# Patient Record
(demographics unavailable — no encounter records)

---

## 2024-12-17 NOTE — CARDIOLOGY SUMMARY
[de-identified] : 6/26/23- Sinus 88, normal axis, no ST changes, QTc 425 10/10/23- Sinus 85, normal axis, no ST abnormality, QTc 438 10/3/24- Sinus 72 1st degree, normal axis, no ST abnormality, QTc 417 [de-identified] : 9/12/23- LV EF 61%, PASP 18, mild MR/UT

## 2024-12-17 NOTE — HISTORY OF PRESENT ILLNESS
[FreeTextEntry1] : 40F prior pregnancy with elevated BP in the 1st trimester (150/100s) then with persistent elevated BP, presents for cardiology evaluation seen on 2023 elevated /90s started on nifedipine but did not tolerate, switched to labetalol 400mg BID, last seen 2023, Echo with LV EF 61% with mild MR/CO, recent still uncontrolled BP up to 150/100s increased labetalol 400mg TID,  2 weeks prior to delivery was instructed to take 600 mg TID, induced and delivered 10/24/2023, last seen 2023 remains on labetalol 600mg BID did not return for 6 weeks visit, self discontinued labetalol use in 2023 but then recently noted early onset HTN, last visit 10/2024 started on Losartan/ HCTZ, now presents for follow-up.  Reports feeling well. States BP has been well controlled since starting Losartan HCTZ 50-12.5mg daily. She did not need to increase the dose. BP has been 120s/ <80. Denies chest pain, SOB at rest, MEJIA, palpitations, lightheadedness, dizziness, fatigue, syncope, near syncope and LE edema. Denies smoking, excessive alcohol and illicit drug use. She does not have much time to exercise due to working and caring for 3 kids.  Prior visit 10/3/2024: Reports her father recent  2 months ago with CAD/acute MI, has no known cardiac risk factors except early onset HTN, since she has been more aware of her health recently noted some headaches then rechecked home BP with persistent readings of 150/110s, denies chest pain or shortness of breath. Returned back to work last week, denies stressors, social alcohol. Has not had blood work since pregnancy and does not have a PCP currently.    Prior visit 2023:  She gave birth to a heathy baby boy 10/24/2023 Discharged on labetalol 600mg TID, with instructions to hold if BP <120/80 Taking 600 qam and 600 qpm she has been holding the afternoon dose for the past 1-2 weeks due to SBP <120, not documented on her BP log. she is not monitoring her BP at night BP continues to run high in the mornings - last high am readings 23 am 153/98   She has had a history of chronic intermittent headaches, not correlating to high BP feels a little winded after carrying objects and running up and down stairs  She denies chest pain, SOB/MEJIA, PND, orthopnea, LE edema, palpitations, lightheadedness, syncope   Prior visit  10/10/2023 Reports feeling well since on TID labetalol with home BP average about 130/80s, denies chest discomfort or LE swelling. Pending  in 2 weeks due to breach position.   Still working as CPA.    Prior visit 2023:  Reports feeling well no cardiac complains, couldn't tolerate nifedipine due to headache, since on labetalol home /90s, has some tingling sensation over her head, no dizziness or palpitations. Denies any legs swelling, pending follow up with OB later this month.    Prior visit 2023:  NASIM in 2023 to be delivered at [facility] Dr. Elva Singh [phone]  1st pregnancy 8yrs with gestational HTN at the last trimester with 2 weeks early, delivered vaginally.  2nd pregnancy 6yrs ago no issue.  Reports feeling well except intermittent headache, home BP logs average 160-190s/90-110s using wrist cuff. She does not have a PCP and no routine primary care previously. Denies prior syncope or dizziness, no chest pain, denies legs edema, reports no prior preeclampsia history. She has started on ASA 81mg for prevention and pending to see MFM.    Nonsmoker, no alcohol Father with HTN age 40s Work as CPA

## 2024-12-17 NOTE — DISCUSSION/SUMMARY
[FreeTextEntry1] : 40F prior pregnancy with elevated BP in the 1st trimester (150/100s) then with persistent elevated BP, presents for cardiology evaluation seen on 6/2023 elevated /90s started on nifedipine but did not tolerate, switched to labetalol 400mg BID, last seen 8/2023, Echo with LV EF 61% with mild MR/ME, recent still uncontrolled BP up to 150/100s increased labetalol 400mg TID,  2 weeks prior to delivery was instructed to take 600 mg TID, induced and delivered 10/24/2023, last seen 11/2023 remains on labetalol 600mg BID did not return for 6 weeks visit, self discontinued labetalol use in 12/2023 but then recently noted early onset HTN, last visit 10/2024 started on Losartan/ HCTZ, now presents for follow-up.  Interval recurrent of elevated BP readings now consistent with early onset HTN since previously with gestational HTN, will need long term antihypertensive use as no other risk factors, no intention for repeat pregnancy. BP controlled on losartan-HCTZ 50-12.5mg. Advised to avoid sodium rich foods/ drinks. Advised to continue home BP monitoring.   Recent paternal history of cardiac death from acute MI, EKG within normal and no cardiac complains, defer stress testing for now. Did not go for labs ordered last visit. Reordered routine labs, lipid, Lp(a), and A1c level. Consider CT-coronary artery calcium score if elevated lipid panel.   Follow up in 6 months, sooner if needed.  Patient verbalized understanding and is in agreement with the above plan.  Candis Villeda was present in office at the time of visit.

## 2025-06-15 NOTE — DISCUSSION/SUMMARY
[FreeTextEntry1] : 40F prior pregnancy with elevated BP in the 1st trimester (150/100s) then with persistent elevated BP, presents for cardiology evaluation seen on 6/2023 elevated /90s started on nifedipine but did not tolerate, switched to labetalol 400mg BID, last seen 8/2023, Echo with LV EF 61% with mild MR/OR, recent still uncontrolled BP up to 150/100s increased labetalol 400mg TID,  2 weeks prior to delivery was instructed to take 600 mg TID, induced and delivered 10/24/2023, last seen 11/2023 remains on labetalol 600mg BID did not return for 6 weeks visit, self discontinued labetalol use in 12/2023 but then recently noted early onset HTN, last visit 10/2024 started on Losartan/ HCTZ, now presents for follow-up.  Interval recurrent of elevated BP readings now consistent with early onset HTN since previously with gestational HTN, will need long term antihypertensive use as no other risk factors, no intention for repeat pregnancy. BP controlled on losartan-HCTZ 50-12.5mg. Advised to avoid sodium rich foods/ drinks. Advised to continue home BP monitoring.   Recent paternal history of cardiac death from acute MI, EKG within normal and no cardiac complains, defer stress testing for now. Did not go for labs ordered last visit. Reordered routine labs, lipid, Lp(a), and A1c level. Consider CT-coronary artery calcium score if elevated lipid panel.   Follow up in 6 months, sooner if needed.  Patient verbalized understanding and is in agreement with the above plan.  Candis Villeda was present in office at the time of visit.

## 2025-06-15 NOTE — DISCUSSION/SUMMARY
[FreeTextEntry1] : 40F prior pregnancy with elevated BP in the 1st trimester (150/100s) then with persistent elevated BP, presents for cardiology evaluation seen on 6/2023 elevated /90s started on nifedipine but did not tolerate, switched to labetalol 400mg BID, last seen 8/2023, Echo with LV EF 61% with mild MR/VT, recent still uncontrolled BP up to 150/100s increased labetalol 400mg TID,  2 weeks prior to delivery was instructed to take 600 mg TID, induced and delivered 10/24/2023, last seen 11/2023 remains on labetalol 600mg BID did not return for 6 weeks visit, self discontinued labetalol use in 12/2023 but then recently noted early onset HTN, last visit 10/2024 started on Losartan/ HCTZ, now presents for follow-up.  Interval recurrent of elevated BP readings now consistent with early onset HTN since previously with gestational HTN, will need long term antihypertensive use as no other risk factors, no intention for repeat pregnancy. BP controlled on losartan-HCTZ 50-12.5mg. Advised to avoid sodium rich foods/ drinks. Advised to continue home BP monitoring.   Recent paternal history of cardiac death from acute MI, EKG within normal and no cardiac complains, defer stress testing for now. Did not go for labs ordered last visit. Reordered routine labs, lipid, Lp(a), and A1c level. Consider CT-coronary artery calcium score if elevated lipid panel.   Follow up in 6 months, sooner if needed.  Patient verbalized understanding and is in agreement with the above plan.  Candis Villeda was present in office at the time of visit.

## 2025-06-15 NOTE — CARDIOLOGY SUMMARY
[de-identified] : 9/12/23- LV EF 61%, PASP 18, mild MR/VA [de-identified] : 6/26/23- Sinus 88, normal axis, no ST changes, QTc 425 10/10/23- Sinus 85, normal axis, no ST abnormality, QTc 438 10/3/24- Sinus 72 1st degree, normal axis, no ST abnormality, QTc 417

## 2025-06-15 NOTE — HISTORY OF PRESENT ILLNESS
[FreeTextEntry1] : 41F prior pregnancy with elevated BP in the 1st trimester (150/100s) then with persistent elevated BP, presents for cardiology evaluation seen on 2023 elevated /90s started on nifedipine but did not tolerate, switched to labetalol 400mg BID, last seen 2023, Echo with LV EF 61% with mild MR/AL, recent still uncontrolled BP up to 150/100s increased labetalol 400mg TID,  2 weeks prior to delivery was instructed to take 600 mg TID, induced and delivered 10/24/2023, last seen 2023 remains on labetalol 600mg BID did not return for 6 weeks visit, self discontinued labetalol use in 2023 but then recently noted early onset HTN, prior visit  started on Losartan/ HCTZ, last seen 2024, now presents for follow-up.   Recent lab , Lp(a) 37.7 A1c 5.1%   Prior visit 2024:  Reports feeling well. States BP has been well controlled since starting Losartan HCTZ 50-12.5mg daily. She did not need to increase the dose. BP has been 120s/ <80. Denies chest pain, SOB at rest, MEJIA, palpitations, lightheadedness, dizziness, fatigue, syncope, near syncope and LE edema. Denies smoking, excessive alcohol and illicit drug use. She does not have much time to exercise due to working and caring for 3 kids.  Prior visit 10/3/2024: Reports her father recent  2 months ago with CAD/acute MI, has no known cardiac risk factors except early onset HTN, since she has been more aware of her health recently noted some headaches then rechecked home BP with persistent readings of 150/110s, denies chest pain or shortness of breath. Returned back to work last week, denies stressors, social alcohol. Has not had blood work since pregnancy and does not have a PCP currently.    Prior visit 2023:  She gave birth to a heathy baby boy 10/24/2023 Discharged on labetalol 600mg TID, with instructions to hold if BP <120/80 Taking 600 qam and 600 qpm she has been holding the afternoon dose for the past 1-2 weeks due to SBP <120, not documented on her BP log. she is not monitoring her BP at night BP continues to run high in the mornings - last high am readings 23 am 153/98   She has had a history of chronic intermittent headaches, not correlating to high BP feels a little winded after carrying objects and running up and down stairs  She denies chest pain, SOB/MEJIA, PND, orthopnea, LE edema, palpitations, lightheadedness, syncope   Prior visit  10/10/2023 Reports feeling well since on TID labetalol with home BP average about 130/80s, denies chest discomfort or LE swelling. Pending  in 2 weeks due to breach position.   Still working as CPA.    Prior visit 2023:  Reports feeling well no cardiac complains, couldn't tolerate nifedipine due to headache, since on labetalol home /90s, has some tingling sensation over her head, no dizziness or palpitations. Denies any legs swelling, pending follow up with OB later this month.    Prior visit 2023:  NASIM in 2023 to be delivered at [facility] Dr. Elva Singh [phone]  1st pregnancy 8yrs with gestational HTN at the last trimester with 2 weeks early, delivered vaginally.  2nd pregnancy 6yrs ago no issue.  Reports feeling well except intermittent headache, home BP logs average 160-190s/90-110s using wrist cuff. She does not have a PCP and no routine primary care previously. Denies prior syncope or dizziness, no chest pain, denies legs edema, reports no prior preeclampsia history. She has started on ASA 81mg for prevention and pending to see MFM.    Nonsmoker, no alcohol Father with HTN age 40s Work as CPA

## 2025-06-15 NOTE — CARDIOLOGY SUMMARY
[de-identified] : 9/12/23- LV EF 61%, PASP 18, mild MR/AL [de-identified] : 6/26/23- Sinus 88, normal axis, no ST changes, QTc 425 10/10/23- Sinus 85, normal axis, no ST abnormality, QTc 438 10/3/24- Sinus 72 1st degree, normal axis, no ST abnormality, QTc 417

## 2025-06-15 NOTE — HISTORY OF PRESENT ILLNESS
[FreeTextEntry1] : 41F prior pregnancy with elevated BP in the 1st trimester (150/100s) then with persistent elevated BP, presents for cardiology evaluation seen on 2023 elevated /90s started on nifedipine but did not tolerate, switched to labetalol 400mg BID, last seen 2023, Echo with LV EF 61% with mild MR/OR, recent still uncontrolled BP up to 150/100s increased labetalol 400mg TID,  2 weeks prior to delivery was instructed to take 600 mg TID, induced and delivered 10/24/2023, last seen 2023 remains on labetalol 600mg BID did not return for 6 weeks visit, self discontinued labetalol use in 2023 but then recently noted early onset HTN, prior visit  started on Losartan/ HCTZ, last seen 2024, now presents for follow-up.   Recent lab , Lp(a) 37.7 A1c 5.1%   Prior visit 2024:  Reports feeling well. States BP has been well controlled since starting Losartan HCTZ 50-12.5mg daily. She did not need to increase the dose. BP has been 120s/ <80. Denies chest pain, SOB at rest, MEJIA, palpitations, lightheadedness, dizziness, fatigue, syncope, near syncope and LE edema. Denies smoking, excessive alcohol and illicit drug use. She does not have much time to exercise due to working and caring for 3 kids.  Prior visit 10/3/2024: Reports her father recent  2 months ago with CAD/acute MI, has no known cardiac risk factors except early onset HTN, since she has been more aware of her health recently noted some headaches then rechecked home BP with persistent readings of 150/110s, denies chest pain or shortness of breath. Returned back to work last week, denies stressors, social alcohol. Has not had blood work since pregnancy and does not have a PCP currently.    Prior visit 2023:  She gave birth to a heathy baby boy 10/24/2023 Discharged on labetalol 600mg TID, with instructions to hold if BP <120/80 Taking 600 qam and 600 qpm she has been holding the afternoon dose for the past 1-2 weeks due to SBP <120, not documented on her BP log. she is not monitoring her BP at night BP continues to run high in the mornings - last high am readings 23 am 153/98   She has had a history of chronic intermittent headaches, not correlating to high BP feels a little winded after carrying objects and running up and down stairs  She denies chest pain, SOB/MEJIA, PND, orthopnea, LE edema, palpitations, lightheadedness, syncope   Prior visit  10/10/2023 Reports feeling well since on TID labetalol with home BP average about 130/80s, denies chest discomfort or LE swelling. Pending  in 2 weeks due to breach position.   Still working as CPA.    Prior visit 2023:  Reports feeling well no cardiac complains, couldn't tolerate nifedipine due to headache, since on labetalol home /90s, has some tingling sensation over her head, no dizziness or palpitations. Denies any legs swelling, pending follow up with OB later this month.    Prior visit 2023:  NASIM in 2023 to be delivered at [facility] Dr. Elva Singh [phone]  1st pregnancy 8yrs with gestational HTN at the last trimester with 2 weeks early, delivered vaginally.  2nd pregnancy 6yrs ago no issue.  Reports feeling well except intermittent headache, home BP logs average 160-190s/90-110s using wrist cuff. She does not have a PCP and no routine primary care previously. Denies prior syncope or dizziness, no chest pain, denies legs edema, reports no prior preeclampsia history. She has started on ASA 81mg for prevention and pending to see MFM.    Nonsmoker, no alcohol Father with HTN age 40s Work as CPA